# Patient Record
(demographics unavailable — no encounter records)

---

## 2024-12-09 NOTE — PLAN
[FreeTextEntry1] : Health Care Maintenance - routine labs performed Nov 2024  - influenza vaccine today - Mammo April 2024  - Pap April 2024 Dr Lopresti - depression screen negative - recommend annual skin cancer screening with Dermatologist - recommended annual eye exam with Ophthalmologist - recommended annual dental exam - no sig pmhx - continue lifestyle modifications - CPE in 1 year or sooner visit as needed

## 2024-12-09 NOTE — HISTORY OF PRESENT ILLNESS
[FreeTextEntry1] : CPE [de-identified] : 42 yo F [facility] left breast biopsy presents for CPE Had had stressful events this year -- Patient had ovarian cyst rupture april 2024.  had heart attack Sept 2024 and father passed away Oct 2024. Family planning --> seeing fertility specialist Dr Kinsey Carroll. Will be trying IUI with sperm donor.

## 2024-12-09 NOTE — HEALTH RISK ASSESSMENT
[No] : In the past 12 months have you used drugs other than those required for medical reasons? No [0] : 2) Feeling down, depressed, or hopeless: Not at all (0) [PHQ-2 Negative - No further assessment needed] : PHQ-2 Negative - No further assessment needed [Never] : Never [NO] : No [Audit-CScore] : 0 [[id number]] : 0 [HIV test declined] : HIV test declined [Hepatitis C test declined] : Hepatitis C test declined [Change in mental status noted] : No change in mental status noted [Language] : denies difficulty with language [Handling Complex Tasks] : denies difficulty handling complex tasks [None] : None [With Significant Other] : lives with significant other [Employed] : employed [] :  [Feels Safe at Home] : Feels safe at home [Fully functional (bathing, dressing, toileting, transferring, walking, feeding)] : Fully functional (bathing, dressing, toileting, transferring, walking, feeding) [Fully functional (using the telephone, shopping, preparing meals, housekeeping, doing laundry, using] : Fully functional and needs no help or supervision to perform IADLs (using the telephone, shopping, preparing meals, housekeeping, doing laundry, using transportation, managing medications and managing finances) [Reports changes in hearing] : Reports no changes in hearing [Reports changes in vision] : Reports no changes in vision [Reports changes in dental health] : Reports no changes in dental health [Smoke Detector] : smoke detector [Carbon Monoxide Detector] : carbon monoxide detector [MammogramDate] : 04/24 [PapSmearDate] : 04/24 [PapSmearComments] : Dr LoPresti [de-identified] : with  [FreeTextEntry2] : [facility] [place]